# Patient Record
Sex: MALE | Race: BLACK OR AFRICAN AMERICAN | NOT HISPANIC OR LATINO | Employment: OTHER | URBAN - METROPOLITAN AREA
[De-identification: names, ages, dates, MRNs, and addresses within clinical notes are randomized per-mention and may not be internally consistent; named-entity substitution may affect disease eponyms.]

---

## 2020-01-15 PROBLEM — C92.02 ACUTE MYELOID LEUKEMIA IN RELAPSE: Status: ACTIVE | Noted: 2020-01-15

## 2020-01-15 PROBLEM — B99.9 INFECTIOUS DISEASE: Status: ACTIVE | Noted: 2020-01-15

## 2020-01-15 PROBLEM — R79.9 HEMATOLOGIC ABNORMALITY: Status: ACTIVE | Noted: 2020-01-15

## 2020-01-16 PROBLEM — E87.8 ELECTROLYTE ABNORMALITY: Status: ACTIVE | Noted: 2020-01-16

## 2020-01-16 PROBLEM — E88.3 TUMOR LYSIS SYNDROME: Status: ACTIVE | Noted: 2020-01-16

## 2020-01-16 PROBLEM — D69.6 THROMBOCYTOPENIA: Status: ACTIVE | Noted: 2020-01-16

## 2020-01-16 PROBLEM — C92.00 ACUTE MYELOID LEUKEMIA: Status: ACTIVE | Noted: 2020-01-16

## 2020-01-23 PROBLEM — D61.818 PANCYTOPENIA: Status: ACTIVE | Noted: 2020-01-23

## 2020-01-30 PROBLEM — E87.6 HYPOKALEMIA: Status: ACTIVE | Noted: 2020-01-30

## 2020-01-31 PROBLEM — D70.1 CHEMOTHERAPY-INDUCED NEUTROPENIA: Status: ACTIVE | Noted: 2020-01-31

## 2020-01-31 PROBLEM — T45.1X5A CHEMOTHERAPY-INDUCED NEUTROPENIA: Status: ACTIVE | Noted: 2020-01-31

## 2020-02-01 PROBLEM — D70.9 NEUTROPENIC FEVER: Status: ACTIVE | Noted: 2020-02-01

## 2020-02-01 PROBLEM — R50.81 NEUTROPENIC FEVER: Status: ACTIVE | Noted: 2020-02-01

## 2020-02-06 PROBLEM — D70.9 NEUTROPENIC FEVER: Status: RESOLVED | Noted: 2020-02-01 | Resolved: 2020-02-06

## 2020-02-06 PROBLEM — R50.81 NEUTROPENIC FEVER: Status: RESOLVED | Noted: 2020-02-01 | Resolved: 2020-02-06

## 2020-02-07 PROBLEM — T45.1X5A CHEMOTHERAPY-INDUCED NEUTROPENIA: Status: RESOLVED | Noted: 2020-01-31 | Resolved: 2020-02-07

## 2020-02-07 PROBLEM — D70.1 CHEMOTHERAPY-INDUCED NEUTROPENIA: Status: RESOLVED | Noted: 2020-01-31 | Resolved: 2020-02-07

## 2020-02-11 PROBLEM — E88.3 TUMOR LYSIS SYNDROME: Status: RESOLVED | Noted: 2020-01-16 | Resolved: 2020-02-11

## 2020-02-11 PROBLEM — E87.6 HYPOKALEMIA: Status: RESOLVED | Noted: 2020-01-30 | Resolved: 2020-02-11

## 2020-02-12 PROBLEM — E87.8 ELECTROLYTE ABNORMALITY: Status: RESOLVED | Noted: 2020-01-16 | Resolved: 2020-02-12

## 2020-03-04 ENCOUNTER — TELEPHONE (OUTPATIENT)
Dept: HEMATOLOGY/ONCOLOGY | Facility: CLINIC | Age: 55
End: 2020-03-04

## 2020-03-04 NOTE — TELEPHONE ENCOUNTER
DAE Beach RN Hi Milene,   Mr. Romero has not had a BM bx in the past week, d/t counts not recovered and then 18% blasts noted in peripheral blood this week.     [place]    ----------------------------------------------------------------------------  From: Nikki Iqbal RN   Sent: 3/3/2020  11:15 AM CST   To: Leydi Orourke RN   Subject: most recent BMBx                                 Holley,     When was the most recent BMBx?  Last notes states scheduled for yesterday.     -Nikki   -----------------------------------------------------------------------------  -----from Patricia De Jesus sent at 3/3/2020  1:03 PM-----  Regarding: RE: BMT transplant benefit check  Nikki,     Patient has been cleared for stem cell transplant evaluation at [facility].     Medical and transplant benefits has been verified for [facility] only.     Thanks   ---------------------------------- Message -----------------------------  From: Nikki Iqbal RN  Sent: 3/3/2020  11:22 AM CST  To: Patricia De Jesus  Subject: BMT transplant benefit check                     [facility] requesting transplant eval.    ThanksNikki

## 2020-03-04 NOTE — LETTER
Fax Transmission                                                                                                                                                       Date: 2020    - - Slide Request - - for Dr Felder     To:           [facility]     From:   Blood & Marrow Transplant                  Navigator - Nikki   Fax:                              [phone] Fax:      [phone]   Phone:     [phone] Phone:  [phone] (Navigator)               [phone] (Pathology lab)         Patient:     CHAKA LOAIZAO.B.   1965    Specimen ID: [id number]   Date Collected:  2014         Please send slides to:     [facility]     Leukemia, Blood & Marrow Transplant Program     3rd Floor     [facility]     Attn:  Transplant Coordinator     [address] [place] [address]  [id number]     [employer] Acct code:  [id number]                               Thank you        IF THERE ARE ANY PROBLEMS WITH THIS TRANSMISSION, PLEASE CALL IMMEDIATELY. THANK YOU    CONFIDENTIALITY NOTICE: The accompanying facsimile is intended solely for the use of the recipient designated above. Document(s) transmitted herewith may contain information that is confidential and privileged. Delivery, distribution of dissemination of this communication other than to the intended recipient is strictly prohibited. If you are another healthcare provider and have received this facsimile in error, please properly dispose and notify the sender. If you are NOT a healthcare provider and have received this facsimile in error, please notify [facility]s Compliance & Privacy Department immediately by email at [email or web address]

## 2020-03-09 PROBLEM — C92.00 AML (ACUTE MYELOBLASTIC LEUKEMIA): Status: ACTIVE | Noted: 2020-03-09

## 2020-03-13 DIAGNOSIS — C92.02 ACUTE MYELOID LEUKEMIA IN RELAPSE: Primary | ICD-10-CM

## 2020-03-13 PROCEDURE — 88321 CONSLTJ&REPRT SLD PREP ELSWR: CPT | Performed by: PATHOLOGY

## 2020-03-13 PROCEDURE — 88325 CONSLTJ COMPRE RVW REC REPRT: CPT | Performed by: PATHOLOGY

## 2020-04-02 PROBLEM — C92.00 AML (ACUTE MYELOBLASTIC LEUKEMIA): Status: RESOLVED | Noted: 2020-03-09 | Resolved: 2020-04-02

## 2020-04-07 PROBLEM — Z51.11 ENCOUNTER FOR ANTINEOPLASTIC CHEMOTHERAPY: Status: ACTIVE | Noted: 2020-04-07

## 2020-04-17 ENCOUNTER — TELEPHONE (OUTPATIENT)
Dept: HEMATOLOGY/ONCOLOGY | Facility: CLINIC | Age: 55
End: 2020-04-17

## 2020-04-24 PROBLEM — H54.7 POOR VISION: Status: ACTIVE | Noted: 2017-06-05

## 2020-04-24 NOTE — TELEPHONE ENCOUNTER
Discussed with Leydi. 2pm on 4/30 better for clinic.  Will change to Dr Hernandez.  Emailed pt activation code to facilitate set up of telemed apptmnt. We'll help pt with technical aspect of meeting. Leydi to speak with wife.

## 2020-04-30 ENCOUNTER — OFFICE VISIT (OUTPATIENT)
Dept: HEMATOLOGY/ONCOLOGY | Facility: CLINIC | Age: 55
End: 2020-04-30
Payer: MEDICAID

## 2020-04-30 DIAGNOSIS — C92.02 ACUTE MYELOID LEUKEMIA IN RELAPSE: Primary | ICD-10-CM

## 2020-04-30 PROCEDURE — 99205 OFFICE O/P NEW HI 60 MIN: CPT | Mod: 95,,, | Performed by: INTERNAL MEDICINE

## 2020-04-30 PROCEDURE — 99205 PR OFFICE/OUTPT VISIT, NEW, LEVL V, 60-74 MIN: ICD-10-PCS | Mod: 95,,, | Performed by: INTERNAL MEDICINE

## 2020-04-30 NOTE — LETTER
April 30, 2020      Sonja Valdez MD  [address] [facility] [address]           [facility]  [address]  [place] [address]  Phone: [phone]          Patient: Tad Romero   MR Number: [id number]   YOB: 1965   Date of Visit: 4/30/2020       Dear Dr. Sonja Valdez:    Thank you for referring Tad Romero to me for evaluation. Attached you will find relevant portions of my assessment and plan of care.    If you have questions, please do not hesitate to call me. I look forward to following Tad Romero along with you.    Sincerely,    Delia Hernandez MD    Enclosure  CC:  No Recipients    If you would like to receive this communication electronically, please contact [email or web address] or [phone] to request more information on [employer] access.    For providers and/or their staff who would like to refer a patient to [facility], please contact us through our one-stop-shop provider referral line, Herman Carrizales, at [phone].    If you feel you have received this communication in error or would no longer like to receive these types of communications, please e-mail [email or web address]

## 2020-04-30 NOTE — PROGRESS NOTES
Subjective:   The patient location is: [facility]  The chief complaint leading to consultation is: AML, in relapse  Visit type: audio only  Total time spent with patient: 30 minutes  Each patient to whom he or she provides medical services by telemedicine is:  (1) informed of the relationship between the physician and patient and the respective role of any other health care provider with respect to management of the patient; and (2) notified that he or she may decline to receive medical services by telemedicine and may withdraw from such care at any time.    Notes: see below       Patient ID: Tad Romero is a 54 y.o. male.    Chief Complaint: Leukemia    Referring Physician: Courtney Mishra is on the phone with his wife for this visit. He has a history of acute myeloid leukemia. 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 He received 7+3 induction and required MEC reinduction with HidAC consolidation completed 1/28/2015.    He presented January 6, 2020 with increasing fatigue, bleeding gums, and 10lb weight loss over 1 month. He was found to have relapse of his AML. He received FLAG-Emilee and HidAC reinduction salvage chemotherapy and has not achieved remission. He started Dacogen-Venetoclax 3/9/2020 which he reports he is tolerating well.    He was seen at [facility] for BMT evaluation after his original diagnosis but social issues precluding proceeding; primarily lack of transportation which remains a current barrier to transplant. He has at least 2 full siblings and is the father of 3 children      HPI  Review of Systems   Constitutional: Positive for fatigue.   HENT: Negative.    Eyes: Negative.    Respiratory: Negative.    Cardiovascular: Negative.    Gastrointestinal: Negative.    Endocrine: Negative.    Genitourinary: Negative.    Skin: Negative.    Allergic/Immunologic:  Negative for environmental allergies, food allergies and immunocompromised state.   Neurological: Negative.    Hematological: Negative for adenopathy. Does not bruise/bleed easily.   Psychiatric/Behavioral: Negative.        Objective:    No exam due to telehealth visit secondary to COVID19  Physical Exam    Assessment:       1. Acute myeloid leukemia in relapse        Plan:       We discussed the process of allogeneic stem cell transplant as a procedure to remove the patients bone marrow with high dose chemotherapy that requires replacement with a donor's stem cells to recover the immune and blood system and to create a graft versus cancer effect. We discussed that this process is cure for AML and may induce long-term remission or disease control. We discussed use of intense chemotherapy and risk of severe side effects during and after transplant. We discussed risk of transplant including discussion regarding graft versus host disease as a life-long complication. We discussed pre-transplant staging and vital organ evaluation. We also discussed consultation with all supportive services. The risk of dying at 30% during or soon after transplant is very concerning to the patient. The risk of severe complications or the transplant not working is also concerning to the patient and his family. We discussed transplant as a potential but definitely not a certain cure for his AML. Alternative options are continuing available therapies to control the AML as long as possible and/or supportive care. He and his family want to discuss his options with his  due to the significant risks involved. Plan to reconnect with him in about 1 month.

## 2020-06-03 ENCOUNTER — OFFICE VISIT (OUTPATIENT)
Dept: HEMATOLOGY/ONCOLOGY | Facility: CLINIC | Age: 55
End: 2020-06-03
Payer: MEDICAID

## 2020-06-03 DIAGNOSIS — C92.02 ACUTE MYELOID LEUKEMIA IN RELAPSE: Primary | ICD-10-CM

## 2020-06-03 PROCEDURE — 99213 PR OFFICE/OUTPT VISIT, EST, LEVL III, 20-29 MIN: ICD-10-PCS | Mod: 95,,, | Performed by: INTERNAL MEDICINE

## 2020-06-03 PROCEDURE — 99213 OFFICE O/P EST LOW 20 MIN: CPT | Mod: 95,,, | Performed by: INTERNAL MEDICINE

## 2020-06-03 NOTE — PROGRESS NOTES
Established Patient - Audio Only Telehealth Visit     The patient location is: [facility]  The chief complaint leading to consultation is: AML  Visit type: Virtual visit with audio only (telephone)  Total time spent with patient: 10 minutes       The reason for the audio only service rather than synchronous audio and video virtual visit was related to technical difficulties or patient preference/necessity.     Each patient to whom I provide medical services by telemedicine is:  (1) informed of the relationship between the physician and patient and the respective role of any other health care provider with respect to management of the patient; and (2) notified that they may decline to receive medical services by telemedicine and may withdraw from such care at any time. Patient verbally consented to receive this service via voice-only telephone call.    This service was not originating from a related E/M service provided within the previous 7 days nor will  to an E/M service or procedure within the next 24 hours or my soonest available appointment.  Prevailing standard of care was able to be met in this audio-only visit.        Patient with history of relapsed AML; discussed BMT and risk of allogeneic transplant about 1 month ago  Patient remains undecided and still needs to discuss more with family.  Would like return call in 1 month to discuss his and his families decision.

## 2020-07-06 ENCOUNTER — OFFICE VISIT (OUTPATIENT)
Dept: HEMATOLOGY/ONCOLOGY | Facility: CLINIC | Age: 55
End: 2020-07-06
Payer: MEDICAID

## 2020-07-06 DIAGNOSIS — C92.02 ACUTE MYELOID LEUKEMIA IN RELAPSE: Primary | ICD-10-CM

## 2020-07-06 PROCEDURE — 99214 PR OFFICE/OUTPT VISIT, EST, LEVL IV, 30-39 MIN: ICD-10-PCS | Mod: 95,,, | Performed by: INTERNAL MEDICINE

## 2020-07-06 PROCEDURE — 99214 OFFICE O/P EST MOD 30 MIN: CPT | Mod: 95,,, | Performed by: INTERNAL MEDICINE

## 2020-07-06 NOTE — PROGRESS NOTES
The patient location is: [facility]  The chief complaint leading to consultation is: AML, in relapse, residual disease after HMA+ venetoclax    Visit type: audio only    Face to Face time with patient: 10  20 minutes of total time spent on the encounter, which includes face to face time and non-face to face time preparing to see the patient (eg, review of tests), Obtaining and/or reviewing separately obtained history, Documenting clinical information in the electronic or other health record, Independently interpreting results (not separately reported) and communicating results to the patient/family/caregiver, or Care coordination (not separately reported).         Each patient to whom he or she provides medical services by telemedicine is:  (1) informed of the relationship between the physician and patient and the respective role of any other health care provider with respect to management of the patient; and (2) notified that he or she may decline to receive medical services by telemedicine and may withdraw from such care at any time.    Notes: Patient still undecided regarding allogeneic stem cell transplant for relapsed AML. Admission planned tomorrow at [facility] for MEC salvage induction chemotherapy.   We discussed if he achieves CR with MEC but does not precede to transplant his AML will relapse in months. We also discussed transplant is a cure, but not guaranteed and significant risk of complications.  It is also suspected there are social issues with the patient getting and being able to stay several months in [place] with a caregiver.    Discussed with Tad that a decision must be made by the time we know the results of MEC chemotherapy. We do not know his donor situation and if donor search needs to be prolonged he may relapse prior to any planned transplant.    We have to have an answer in a few weeks. This was discussed with the patient.

## 2020-07-07 PROBLEM — R79.9 HEMATOLOGIC ABNORMALITY: Status: RESOLVED | Noted: 2020-01-15 | Resolved: 2020-07-07

## 2020-07-11 PROBLEM — D64.81 ANEMIA DUE TO ANTINEOPLASTIC CHEMOTHERAPY: Status: ACTIVE | Noted: 2020-07-11

## 2020-07-11 PROBLEM — T45.1X5A ANEMIA DUE TO ANTINEOPLASTIC CHEMOTHERAPY: Status: ACTIVE | Noted: 2020-07-11

## 2020-07-23 PROBLEM — D70.9 NEUTROPENIC FEVER: Status: ACTIVE | Noted: 2020-07-23

## 2020-07-23 PROBLEM — R50.81 NEUTROPENIC FEVER: Status: ACTIVE | Noted: 2020-07-23

## 2020-07-23 PROBLEM — D64.9 SEVERE ANEMIA: Status: ACTIVE | Noted: 2020-07-23

## 2020-07-23 PROBLEM — R51.9 HEADACHE: Status: ACTIVE | Noted: 2020-07-23

## 2020-07-23 PROBLEM — D70.9 NEUTROPENIA: Status: ACTIVE | Noted: 2020-07-23

## 2020-07-26 PROBLEM — D70.9 NEUTROPENIC FEVER: Status: RESOLVED | Noted: 2020-07-23 | Resolved: 2020-07-26

## 2020-07-26 PROBLEM — D70.9 NEUTROPENIC FEVER: Status: ACTIVE | Noted: 2020-01-15

## 2020-07-26 PROBLEM — R50.81 NEUTROPENIC FEVER: Status: ACTIVE | Noted: 2020-01-15

## 2020-07-26 PROBLEM — R50.81 NEUTROPENIC FEVER: Status: RESOLVED | Noted: 2020-07-23 | Resolved: 2020-07-26

## 2020-07-27 PROBLEM — K92.1 MELENA: Status: ACTIVE | Noted: 2020-07-27

## 2020-08-02 PROBLEM — K92.2 GI BLEEDING: Status: ACTIVE | Noted: 2020-08-02

## 2020-08-03 PROBLEM — B99.9 INFECTIOUS DISEASE: Status: ACTIVE | Noted: 2020-08-03

## 2020-08-31 PROBLEM — R51.9 HEADACHE: Status: RESOLVED | Noted: 2020-07-23 | Resolved: 2020-01-01

## 2020-08-31 PROBLEM — D70.9 NEUTROPENIC FEVER: Status: RESOLVED | Noted: 2020-01-15 | Resolved: 2020-01-01

## 2020-08-31 PROBLEM — D70.9 NEUTROPENIA: Status: RESOLVED | Noted: 2020-07-23 | Resolved: 2020-01-01

## 2020-08-31 PROBLEM — Z51.11 ENCOUNTER FOR ANTINEOPLASTIC CHEMOTHERAPY: Status: RESOLVED | Noted: 2020-04-07 | Resolved: 2020-01-01

## 2020-08-31 PROBLEM — R50.81 NEUTROPENIC FEVER: Status: RESOLVED | Noted: 2020-01-15 | Resolved: 2020-01-01

## 2020-08-31 PROBLEM — K92.1 MELENA: Status: RESOLVED | Noted: 2020-07-27 | Resolved: 2020-01-01

## 2020-08-31 PROBLEM — K92.2 GI BLEEDING: Status: RESOLVED | Noted: 2020-08-02 | Resolved: 2020-01-01

## 2020-09-15 PROBLEM — Z51.11 ENCOUNTER FOR ANTINEOPLASTIC CHEMOTHERAPY: Status: ACTIVE | Noted: 2020-01-01

## 2020-11-19 PROBLEM — C34.90 LUNG CANCER: Status: ACTIVE | Noted: 2020-01-01

## 2020-12-07 PROBLEM — Z79.899 MEDICATION MANAGEMENT: Status: ACTIVE | Noted: 2020-01-01

## 2020-12-07 PROBLEM — Z51.12 ENCOUNTER FOR ANTINEOPLASTIC CHEMOTHERAPY AND IMMUNOTHERAPY: Status: ACTIVE | Noted: 2020-01-01

## 2020-12-10 PROBLEM — D75.9 CYTOPENIA: Status: ACTIVE | Noted: 2020-01-15

## 2020-12-10 PROBLEM — D84.9 IMMUNOSUPPRESSION: Status: ACTIVE | Noted: 2020-01-01

## 2020-12-22 PROBLEM — Z92.21 S/P CHEMOTHERAPY, TIME SINCE 4-12 WEEKS: Status: ACTIVE | Noted: 2020-01-01

## 2021-01-01 ENCOUNTER — PATIENT MESSAGE (OUTPATIENT)
Dept: RESEARCH | Facility: HOSPITAL | Age: 56
End: 2021-01-01

## 2021-01-01 ENCOUNTER — SPECIALTY PHARMACY (OUTPATIENT)
Dept: PHARMACY | Facility: CLINIC | Age: 56
End: 2021-01-01

## 2021-01-08 PROBLEM — C92.00 AML (ACUTE MYELOBLASTIC LEUKEMIA): Status: ACTIVE | Noted: 2021-01-01

## 2021-01-19 PROBLEM — R17 ELEVATED BILIRUBIN: Status: ACTIVE | Noted: 2021-01-01

## 2021-02-02 PROBLEM — B49 FUNGAL INFECTION OF LUNG: Status: ACTIVE | Noted: 2021-01-01

## 2021-03-17 PROBLEM — C34.90 LUNG CANCER: Status: RESOLVED | Noted: 2020-01-01 | Resolved: 2021-01-01

## 2021-05-04 PROBLEM — R53.1 WEAKNESS: Status: ACTIVE | Noted: 2021-01-01

## 2021-05-05 PROBLEM — K59.00 CONSTIPATION: Status: ACTIVE | Noted: 2021-01-01

## 2021-05-07 PROBLEM — E83.42 HYPOMAGNESEMIA: Status: ACTIVE | Noted: 2021-01-01
